# Patient Record
Sex: FEMALE | Race: BLACK OR AFRICAN AMERICAN | NOT HISPANIC OR LATINO | URBAN - METROPOLITAN AREA
[De-identification: names, ages, dates, MRNs, and addresses within clinical notes are randomized per-mention and may not be internally consistent; named-entity substitution may affect disease eponyms.]

---

## 2020-08-26 ENCOUNTER — EMERGENCY (EMERGENCY)
Facility: HOSPITAL | Age: 35
LOS: 0 days | Discharge: HOME | End: 2020-08-26
Payer: MEDICAID

## 2020-08-26 VITALS
DIASTOLIC BLOOD PRESSURE: 68 MMHG | HEIGHT: 68 IN | OXYGEN SATURATION: 100 % | TEMPERATURE: 98 F | RESPIRATION RATE: 16 BRPM | HEART RATE: 82 BPM | SYSTOLIC BLOOD PRESSURE: 112 MMHG | WEIGHT: 134.92 LBS

## 2020-08-26 DIAGNOSIS — L02.211 CUTANEOUS ABSCESS OF ABDOMINAL WALL: ICD-10-CM

## 2020-08-26 DIAGNOSIS — L02.91 CUTANEOUS ABSCESS, UNSPECIFIED: ICD-10-CM

## 2020-08-26 DIAGNOSIS — Z88.5 ALLERGY STATUS TO NARCOTIC AGENT: ICD-10-CM

## 2020-08-26 DIAGNOSIS — Z88.1 ALLERGY STATUS TO OTHER ANTIBIOTIC AGENTS STATUS: ICD-10-CM

## 2020-08-26 PROCEDURE — 99283 EMERGENCY DEPT VISIT LOW MDM: CPT | Mod: 25

## 2020-08-26 PROCEDURE — 10061 I&D ABSCESS COMP/MULTIPLE: CPT

## 2020-08-26 NOTE — ED PROVIDER NOTE - OBJECTIVE STATEMENT
36 y/o female w/ no PMH presents complaining of abscess near umbilicus x 3 weeks. Reports that she's been trying ot do intermittent warm compresses w/ no alleviation in sx. Has not taken any meds for alleviation in sx. Has had multiple abscesses throughout the body but none recently. Reports that she feels well otherwise without any other complaints/concerns. Tetanus status is uptodate as per patient.    Denies fevers, chills, chest pain, dyspnea, abdominal pain, n/v/d, headaches, dizziness, LOC, syncope, or any other constitutional sx.    PMH: none  Allergies: NKDA 34 y/o female w/ no PMH presents complaining of abscess near umbilicus x 3 weeks. Reports that she's been trying ot do intermittent warm compresses w/ no alleviation in sx. Has not taken any meds for alleviation in sx. Has had multiple abscesses throughout the body but none recently. Reports that she feels well otherwise without any other complaints/concerns. Tetanus status is uptodate as per patient.    Denies fevers, chills, chest pain, dyspnea, abdominal pain, n/v/d, headaches, dizziness, LOC, syncope, or any other constitutional sx.    PMH: none  Allergies: Codeine, Vancomycin 36 y/o female w/ no PMH presents complaining of abscess near umbilicus x 3 weeks. Reports that she's been trying to do intermittent warm compresses w/ no alleviation in sx. Has not taken any meds for alleviation in sx. Has had multiple abscesses throughout the body but none recently. Reports that she feels well otherwise without any other complaints/concerns. Tetanus status is uptodate as per patient.    Denies fevers, chills, chest pain, dyspnea, abdominal pain, n/v/d, headaches, dizziness, LOC, syncope, or any other constitutional sx.    PMH: none  Allergies: Codeine, Vancomycin

## 2020-08-26 NOTE — ED PROVIDER NOTE - PROGRESS NOTE DETAILS
I&D done and patient tolerated procedure well, clindamycin sent to pharmacy, made aware to f/u with us in 2 days for wound re-check. F/u recommended with PCP and if not accessible then to follow up with us, return to ED if sx worsen. All questions and concerns addressed. I&D done and patient tolerated procedure well, clindamycin sent to pharmacy, made aware to f/u with us in 2 days for wound re-check. F/u recommended with PCP and if not accessible then to follow up with us, return to ED if sx worsen. All questions and concerns addressed. AS PER PATIENT, SHE HAS TAKEN CLINDAMYCIN BEFORE WITHOUT ANY PROBLEM AND DOES NOT HAVE ANY PROBLEM TAKING CLINDAMYCIN. SHE CALLED THE PHARMACY TO VERIFY AND HAS TAKEN AUGMENTIN AND CLINDAMYCIN AS ANTIBIOTICS FOR ABSCESSES WITHOUT ANY ALLERGIC REACTION. Pt wants clindamycin sent to pharmacy and stated that he has worked better for her.

## 2020-08-26 NOTE — ED PROCEDURE NOTE - PROCEDURE ADDITIONAL DETAILS
abscess noted with surrounding induration L of umbilicus not including the umbilicus, Area cleaned thoroughly, lidocaine 1% used to numb the surrounding area, #11 blade used to make an incision about 1 cm incision made, purulent/bloody drainage noted, it was then packed with iodoform strip, covered with 4x4 and tape. Pt tolerated the procedure well.

## 2020-08-26 NOTE — ED ADULT NURSE NOTE - OBJECTIVE STATEMENT
received ft c/o painful abscess periumbilical area x 3 weeks tender upon palpation no drainage noted denies fever/chills

## 2020-08-26 NOTE — ED PROVIDER NOTE - NSFOLLOWUPCLINICS_GEN_ALL_ED_FT
[facility]  Wound Care  [address] [place] [address]  Phone: [phone]  Fax: [phone]  Follow Up Time: 1-3 Days

## 2020-08-26 NOTE — ED PROVIDER NOTE - CHPI ED SYMPTOMS NEG
no red streaks/no redness/no vomiting/no bleeding at site/no chills/no drainage/no fever/no purulent drainage/no bleeding

## 2020-08-26 NOTE — ED PROVIDER NOTE - PHYSICAL EXAMINATION
4x3 cm abscess noted near left side of umbilicus not involving the umbilicus that is tender to touch with fluctuancy, surrounding indurated area, no drainage noted, there is no significant erythema. Rest of the abdomen is benign.

## 2020-08-26 NOTE — ED PROVIDER NOTE - SKIN AREA #1
4x3 cm abscess noted near left side of umbilicus not involving the umbilicus that is tender to touch with fluctuancy, no induration, no drainage noted.

## 2020-08-26 NOTE — ED PROVIDER NOTE - PATIENT PORTAL LINK FT
You can access the FollowMyHealth Patient Portal offered by [facility] by registering at the following website: [email or web address]. By joining [employer]’s FollowMyHealth portal, you will also be able to view your health information using other applications (apps) compatible with our system.

## 2020-11-08 ENCOUNTER — EMERGENCY (EMERGENCY)
Facility: HOSPITAL | Age: 35
LOS: 0 days | Discharge: HOME | End: 2020-11-08
Payer: MEDICAID

## 2020-11-08 VITALS
RESPIRATION RATE: 18 BRPM | DIASTOLIC BLOOD PRESSURE: 87 MMHG | TEMPERATURE: 100 F | HEART RATE: 110 BPM | OXYGEN SATURATION: 97 % | HEIGHT: 68 IN | WEIGHT: 139.99 LBS | SYSTOLIC BLOOD PRESSURE: 133 MMHG

## 2020-11-08 VITALS
DIASTOLIC BLOOD PRESSURE: 82 MMHG | OXYGEN SATURATION: 99 % | TEMPERATURE: 98 F | SYSTOLIC BLOOD PRESSURE: 124 MMHG | HEART RATE: 90 BPM | RESPIRATION RATE: 18 BRPM

## 2020-11-08 DIAGNOSIS — L02.31 CUTANEOUS ABSCESS OF BUTTOCK: ICD-10-CM

## 2020-11-08 DIAGNOSIS — L05.01 PILONIDAL CYST WITH ABSCESS: ICD-10-CM

## 2020-11-08 PROCEDURE — 10080 I&D PILONIDAL CYST SIMPLE: CPT

## 2020-11-08 PROCEDURE — 99284 EMERGENCY DEPT VISIT MOD MDM: CPT | Mod: 25

## 2020-11-08 RX ORDER — KETOROLAC TROMETHAMINE 30 MG/ML
60 SYRINGE (ML) INJECTION ONCE
Refills: 0 | Status: DISCONTINUED | OUTPATIENT
Start: 2020-11-08 | End: 2020-11-08

## 2020-11-08 RX ORDER — IBUPROFEN 200 MG
1 TABLET ORAL
Qty: 30 | Refills: 0
Start: 2020-11-08 | End: 2020-11-17

## 2020-11-08 RX ORDER — AZTREONAM 2 G
2 VIAL (EA) INJECTION
Qty: 28 | Refills: 0
Start: 2020-11-08 | End: 2020-11-14

## 2020-11-08 RX ADMIN — Medication 1 TABLET(S): at 22:27

## 2020-11-08 RX ADMIN — Medication 60 MILLIGRAM(S): at 20:29

## 2020-11-08 RX ADMIN — Medication 60 MILLIGRAM(S): at 20:26

## 2020-11-08 NOTE — ED PROVIDER NOTE - PATIENT PORTAL LINK FT
You can access the FollowMyHealth Patient Portal offered by [facility] by registering at the following website: [email or web address]. By joining [employer]’s FollowMyHealth portal, you will also be able to view your health information using other applications (apps) compatible with our system.

## 2020-11-08 NOTE — ED ADULT NURSE NOTE - OBJECTIVE STATEMENT
Patient presents in ED complaining of painful , swollen pilondal abscess, x 1 week. Pain 10/10. Drainage small amount greenish color

## 2020-11-08 NOTE — ED ADULT NURSE NOTE - NSIMPLEMENTINTERV_GEN_ALL_ED
Implemented All Universal Safety Interventions:  [place] to call system. Call bell, personal items and telephone within reach. Instruct patient to call for assistance. Room bathroom lighting operational. Non-slip footwear when patient is off stretcher. Physically safe environment: no spills, clutter or unnecessary equipment. Stretcher in lowest position, wheels locked, appropriate side rails in place.

## 2020-11-08 NOTE — ED PROVIDER NOTE - OBJECTIVE STATEMENT
36 y/o F with hx of pilonidal cyst x 15 years ago presents to ED c/o intergluteal swelling and pain x days no associated symptoms denies fever, trauma, N/V and other concerns.

## 2020-11-08 NOTE — ED PROVIDER NOTE - CLINICAL SUMMARY MEDICAL DECISION MAKING FREE TEXT BOX
34 y/o F with pilonidal cyst drained with no concerns pt felt better, culture sent to lab treated with abx tetanus UTD she was dc home to return  to ED in 2 days for wound check.

## 2020-11-08 NOTE — ED PROVIDER NOTE - SKIN, MLM
Skin normal color for race, warm, dry and intact. intergluteal swelling, TTP, mild erythema, fluctuant.

## 2020-11-09 PROBLEM — Z78.9 OTHER SPECIFIED HEALTH STATUS: Chronic | Status: ACTIVE | Noted: 2020-08-26

## 2020-11-10 ENCOUNTER — EMERGENCY (EMERGENCY)
Facility: HOSPITAL | Age: 35
LOS: 0 days | Discharge: HOME | End: 2020-11-10
Payer: MEDICAID

## 2020-11-10 VITALS
HEIGHT: 68 IN | OXYGEN SATURATION: 98 % | WEIGHT: 139.99 LBS | TEMPERATURE: 98 F | SYSTOLIC BLOOD PRESSURE: 117 MMHG | DIASTOLIC BLOOD PRESSURE: 77 MMHG | HEART RATE: 92 BPM | RESPIRATION RATE: 20 BRPM

## 2020-11-10 DIAGNOSIS — L05.01 PILONIDAL CYST WITH ABSCESS: ICD-10-CM

## 2020-11-10 PROCEDURE — L9995: CPT

## 2020-11-10 NOTE — ED PROVIDER NOTE - OBJECTIVE STATEMENT
This is a 36 yo F here for wound check of piloidal cyst abscess w relief s/p I&D x 1 day. Denies n/v/d/sob chest pain This is a 34 yo F here for wound check of piloidal cyst abscess w relief s/p I&D  and abx x 1 day. Denies n/v/d/sob chest pain

## 2020-11-10 NOTE — ED PROVIDER NOTE - PATIENT PORTAL LINK FT
You can access the FollowMyHealth Patient Portal offered by [facility] by registering at the following website: [email or web address]. By joining [employer]’s FollowMyHealth portal, you will also be able to view your health information using other applications (apps) compatible with our system.

## 2020-11-10 NOTE — ED PROVIDER NOTE - CLINICAL SUMMARY MEDICAL DECISION MAKING FREE TEXT BOX
rest f/u w pmd in 1 week , continue abx   Discussed results and outcome of testing with the patient.  Patient advised to please follow up with their primary care doctor within the next 24-48 hours and return to the Emergency Department for worsening symptoms or any other concerns.  Patient advised that their doctor may call  to follow up on the specific results of the tests performed today in the emergency department.

## 2020-11-13 LAB
CULTURE RESULTS: SIGNIFICANT CHANGE UP
SPECIMEN SOURCE: SIGNIFICANT CHANGE UP

## 2021-02-02 NOTE — ED ADULT TRIAGE NOTE - ARRIVAL FROM
[facility] Call Center    Phone Message    May a detailed message be left on voicemail: yes     Reason for Call: Pt said that he was returning a call to [place].  No one was available to speak with him at the time he called.  Please try calling Pt back.  Thanks.    Action Taken: Message routed to:  Adult Clinics: Gastroenterology (GI) p [id number]    Travel Screening: Not Applicable                                                                       Home

## 2021-07-23 NOTE — ED ADULT TRIAGE NOTE - AS TEMP SITE
US Results (most recent):  Results from Orders Only encounter on 07/16/21    US THYROID/PARATHYROID/SOFT TISS    Narrative  EXAM:  US THYROID/ PARATHYROID/ SOFT TISSUE    INDICATION:   Multiple thyroid nodules [E04.2]    COMPARISON:  None. FINDINGS:  Real-time sonography of the thyroid gland is performed with a high  frequency linear transducer (operating frequency of 15 MHz for this scan). Multiple static images are obtained. Size measurements are presented in length  x height x width format. Isthmus: The isthmus measures about 0.62 cm in thickness. - Left paramedian isthmic region small hypoechoic 0.5 x 0.3 x 0.4 cm solid  circumscribed nodule, stable  - Right paramedian hypoechoic solid circumscribed 0.4 x 0.3 x 0.3 cm nodule,  stable. Right lobe:    - The right lobe measures 5.9 x 2.3 x 2.3 cm.  - 1.2 x 0.7 x 0.9 cm medial interpolar region posterior aspect hypoechoic newly  cystic focus. Previously 1.6 x 1.1 x 1.6 cm. TIRADS 2.  - 0.9 x 0.7 x 0.9 cm isoechoic to minimally hypoechoic solid mid to upper  interpolar region posterior aspect nodule. Previously 1.1 x 1.1 x 1.2 cm. TIRADS 3.  - 1.2 x 0.9 x 1.2 cm isoechoic to slightly hypoechoic solid nodule along the  posterior aspect, previously measuring 1.2 x 0.9 x 1.2 cm. TIRADS 3. Left lobe:    - The left lobe measures 5.4 x 1.7 x 2.3 cm.  - 0.7 x 0.3 x 0.4 cm upper pole slightly hypoechoic solid circumscribed nodule. 0.6 x 0.4 x 0.5 cm on prior study. TIRADS 3.  - 0.9 x 0.8 x 0.9 cm mid interpolar region posterolateral aspect hypoechoic  solid nodule with circumscribed borders. 1.2 x 0.8 x 1.0 cm on prior scan. TIRADS 4 morphologically but probably should be considered TIRADS 3 based on  interval decrease in size. - 1.0 x 0.7 x 0.8 cm hypoechoic solid lobular irregular nodule along  posterolateral aspect at the mid to lower interpolar region.   1.0 x 0.5 x 0.9 cm  previously but this previous nodule may not coincide with current nodule. Questionable presence of calcifications in the nodule. TIRADS 5. Impression  Multiple thyroid nodules as described. The majority appear stable and of low  degree of suspicion. The left lower pole region hypoechoic structure is of  unclear chronicity because of possible differences in location of the nodule on  the prior scan and current scan. Recommend ultrasound guided FNA. TR 5. Reviewed thyroid ultrasound results. Left lower lobe nodule assessed to be a TIRADS 5 and recommendation for ultrasound-guided FNA given unclear stability from prior ultrasound 1 year ago. Will place referral for Dr. Adrian Peters at [facility] endocrinology. Please fax copy of thyroid ultrasound (7/21/2021) with referral.    Called and discussed findings and recommendations with patient. Agreeable to proceed with endocrinology referral.  Answered all questions. oral

## 2022-12-12 NOTE — ED ADULT NURSE NOTE - TEMPLATE LIST FOR HEAD TO TOE ASSESSMENT
Patient I saw back in January of 2021 almost 2 years ago she is little bit more short of breath now she continues to use Ventolin p.r.n. and and theophylline.  She is not currently smoking gained some weight my plan would be to add Anoro and see how she does   Wounds

## 2023-02-02 NOTE — ED ADULT NURSE NOTE - CHPI ED NUR DURATION
week(s) Mart-1 - Positive Histology Text: MART-1 staining demonstrates areas of higher density and clustering of melanocytes with Pagetoid spread upwards within the epidermis. The surgical margins are positive for tumor cells.

## 2024-04-01 NOTE — ED PROVIDER NOTE - RELIEVING FACTORS
[FreeTextEntry1] : Left sided thyroid nodule 3.07 cm meeting FNA criteria  [FreeTextEntry2] : Will schedule for FNA  none heat